# Patient Record
Sex: FEMALE | Race: WHITE
[De-identification: names, ages, dates, MRNs, and addresses within clinical notes are randomized per-mention and may not be internally consistent; named-entity substitution may affect disease eponyms.]

---

## 2017-11-25 NOTE — RADRPT
PROCEDURE:   XR Left second finger

 

CLINICAL INDICATION:   Pain at distal interphalangeal joint foreign body

 

TECHNIQUE:   AP, oblique and lateral views of the left second finger were obtained. 

 

COMPARISON:   No prior studies are available for comparison. 

 

FINDINGS:

In the region of the patient's pain at the level of the proximal interphalangeal joint of the second
 finger is apparent dorsal skin defect. No definite radiopaque foreign body is seen. No fracture or 
dislocation is seen.

 

IMPRESSION:

In the region of the patient's pain at the level of the proximal interphalangeal joint of the second
 finger is apparent dorsal skin defect. No definite radiopaque foreign body is seen. 

 

RPTAT: HJES

_____________________________________________ 

.Ronald Lenz MD, MD           Date    Time 

Electronically viewed and signed by .Ronald Lenz MD, MD on 11/25/2017 02:50 

 

D:  11/25/2017 02:50  T:  11/25/2017 02:50

.S/

## 2017-11-25 NOTE — ERD
ER Documentation


Chief Complaint


Chief Complaint


left index finger pain denies injury, skin avulsion





HPI


22-year-old female complaining of pain to the left index finger.  Patient 

states that one month ago she was at home and a piece of glass broke and cut 

her finger.  She is unsure if there is a foreign body within her finger.  She 

never had site sutured and has developed a abnormal flap of skin over the wound 

site.  Patient has not use any medications on the site





ROS


All systems reviewed and are negative except as per history of present illness.





Medications


Home Meds


Reported Medications


Levothyroxine Sodium* (Levothyroxine Sodium*) 75 Mcg Tablet, 75 MCG PO DAILY


   12


Metformin* (Glucophage*) 500 Mg Tab, 500 MG PO BID


   12





Allergies


Allergies:  


Coded Allergies:  


     No Known Allergy (Unverified , 1/21/15)





PMhx/Soc


Medical and Surgical Hx:  pt denies Surgical Hx


History of Surgery:  No


Anesthesia Reaction:  No


Hx Neurological Disorder:  No


Hx Respiratory Disorders:  Yes (ASTHMA)


Hx Cardiac Disorders:  No


Hx Psychiatric Problems:  No


Hx Miscellaneous Medical Probl:  Yes (hypothyroidism)


Hx Alcohol Use:  No


Hx Substance Use:  No


Hx Tobacco Use:  No


Smoking Status:  Never smoker





Physical Exam


Vitals





Vital Signs








  Date Time  Temp Pulse Resp B/P Pulse Ox O2 Delivery O2 Flow Rate FiO2


 


17 22:58 97.2 87 20 137/74 98   








Physical Exam


GENERAL: The patient is well-appearing, well-nourished, in no acute distress


CHEST: Clear to auscultation bilaterally.  There are no rales, wheezes or 

rhonchi.


HEART: Regular rate and rhythm.  No murmurs, clicks, rubs or gallops.  No S3 or 

S4.


EXTREMITIES: Equal pulses bilaterally.  There is no peripheral clubbing, 

cyanosis or edema.  No focal swelling or erythema.  Full range of motion.  

Grossly neurovascularly intact.


NEUROLOGIC: Alert and oriented.  Cranial nerves II through XII intact.  Motor 

strength in all 4 extremities with 5 out of 5 strength.  Sensation grossly 

intact.  Normal speech and gait.  Babinski negative.  DTR 2+ throughout.


SKIN: skin nodule over left index finger. No open wound. No tenderness to 

palpation





Procedures/MDM


DIAGNOSTIC IMAGING REPORT





 Patient: VALERIE CHEN   : 1995   Age: 22  Sex: F                  

      


 MR #:    [id number]   [facility] #:   [id number]    DOS: [id number]


 Ordering MD: HONORIO DIEGO PA-C   Location:  FTE   Room/Bed:          

                                  


 








PROCEDURE:   XR Left second finger


 


CLINICAL INDICATION:   Pain at distal interphalangeal joint foreign body


 


TECHNIQUE:   AP, oblique and lateral views of the left second finger were 

obtained. 


 


COMPARISON:   No prior studies are available for comparison. 


 


FINDINGS:


In the region of the patient's pain at the level of the proximal 

interphalangeal joint of the second finger is apparent dorsal skin defect. No 

definite radiopaque foreign body is seen. No fracture or dislocation is seen.


 


IMPRESSION:


In the region of the patient's pain at the level of the proximal 

interphalangeal joint of the second finger is apparent dorsal skin defect. No 

definite radiopaque foreign body is seen. 


 


MDM: 22-year-old female complaining of skin nodule at the site of a possible 

foreign body.  X-rays within normal limits and patient does not have tenderness 

to palpation over the wound site.  I believe patient's skin nodule has 

developed because it was a laceration that should have been prepared with 

sutures.  Skin has likely keloid-ed and because a elevation in the skin.  I do 

not feel that there is indication for removal of this keloid and patient is 

recommended to follow-up with PMD within 1 to days for close evaluation.  All 

questions answered discharge.





Departure


Diagnosis:  


 Primary Impression:  


 Soft tissue avulsion


Condition:  Stable


Patient Instructions:  Skin Avulsion


Referrals:  


[facility]


YOU HAVE RECEIVED A MEDICAL SCREENING EXAM AND THE RESULTS INDICATE THAT YOU DO 

NOT HAVE A CONDITION THAT REQUIRES URGENT TREATMENT IN THE EMERGENCY DEPARTMENT.





FURTHER EVALUATION AND TREATMENT OF YOUR CONDITION CAN WAIT UNTIL YOU ARE SEEN 

IN YOUR DOCTORS OFFICE WITHIN THE NEXT 1-2 DAYS. IT IS YOUR RESPONSIBILITY TO 

MAKE AN APPOINTMENT FOR FOLOW-UP CARE.





IF YOU HAVE A PRIMARY DOCTOR


--you should call your primary doctor and schedule an appointment





IF YOU DO NOT HAVE A PRIMARY DOCTOR YOU CAN CALL OUR PHYSICIAN REFERRAL HOTLINE 

AT


 [phone] 





IF YOU CAN NOT AFFORD TO SEE A PHYSICIAN YOU CAN CHOSE FROM THE FOLLOWING 

[facility]





[facility] [phone] [address] [facility]. [facility] [phone] [address] [facility]. [facility] [phone] [address] [facility] [phone] [address] [facility] [phone] [address] [facility]. [facility] [phone] [address]





Additional Instructions:  


FOLLOW UP WITH YOUR PRIMARY CARE PHYSICIAN TOMORROW.Return to this facility if 

you are not improving as expected.











SEVERINO DIEGO PA-C 2017 03:50

## 2018-06-03 ENCOUNTER — HOSPITAL ENCOUNTER (EMERGENCY)
Age: 23
Discharge: HOME | End: 2018-06-03

## 2018-06-03 ENCOUNTER — HOSPITAL ENCOUNTER (EMERGENCY)
Dept: HOSPITAL 91 - FTE | Age: 23
Discharge: HOME | End: 2018-06-03
Payer: COMMERCIAL

## 2018-06-03 DIAGNOSIS — Z79.84: ICD-10-CM

## 2018-06-03 DIAGNOSIS — E03.9: ICD-10-CM

## 2018-06-03 DIAGNOSIS — H11.32: Primary | ICD-10-CM

## 2018-06-03 DIAGNOSIS — J45.909: ICD-10-CM

## 2018-06-03 PROCEDURE — 99282 EMERGENCY DEPT VISIT SF MDM: CPT

## 2021-04-19 ENCOUNTER — HOSPITAL ENCOUNTER (EMERGENCY)
Dept: HOSPITAL 12 - ER | Age: 26
Discharge: HOME | End: 2021-04-19
Payer: COMMERCIAL

## 2021-04-19 VITALS — HEIGHT: 64 IN | WEIGHT: 264 LBS | BODY MASS INDEX: 45.07 KG/M2

## 2021-04-19 DIAGNOSIS — R25.3: Primary | ICD-10-CM

## 2021-04-19 DIAGNOSIS — E66.9: ICD-10-CM

## 2021-04-19 DIAGNOSIS — R51.9: ICD-10-CM

## 2021-04-19 PROCEDURE — A4663 DIALYSIS BLOOD PRESSURE CUFF: HCPCS

## 2022-02-22 ENCOUNTER — HOSPITAL ENCOUNTER (EMERGENCY)
Dept: HOSPITAL 12 - ER | Age: 27
Discharge: HOME | End: 2022-02-22
Payer: COMMERCIAL

## 2022-02-22 VITALS — DIASTOLIC BLOOD PRESSURE: 65 MMHG | SYSTOLIC BLOOD PRESSURE: 139 MMHG

## 2022-02-22 VITALS — WEIGHT: 268 LBS | BODY MASS INDEX: 45.75 KG/M2 | HEIGHT: 64 IN

## 2022-02-22 DIAGNOSIS — K21.9: ICD-10-CM

## 2022-02-22 DIAGNOSIS — Z79.890: ICD-10-CM

## 2022-02-22 DIAGNOSIS — Z79.899: ICD-10-CM

## 2022-02-22 DIAGNOSIS — E66.9: ICD-10-CM

## 2022-02-22 DIAGNOSIS — R10.10: Primary | ICD-10-CM

## 2022-02-22 DIAGNOSIS — E03.9: ICD-10-CM

## 2022-02-22 LAB
ALP SERPL-CCNC: 75 U/L (ref 50–136)
ALT SERPL W/O P-5'-P-CCNC: 25 U/L (ref 14–59)
AST SERPL-CCNC: 14 U/L (ref 15–37)
BILIRUB DIRECT SERPL-MCNC: 0.1 MG/DL (ref 0–0.2)
BILIRUB SERPL-MCNC: 0.5 MG/DL (ref 0.2–1)
BUN SERPL-MCNC: 7 MG/DL (ref 7–18)
CHLORIDE SERPL-SCNC: 102 MMOL/L (ref 98–107)
CO2 SERPL-SCNC: 25 MMOL/L (ref 21–32)
CREAT SERPL-MCNC: 0.7 MG/DL (ref 0.6–1.3)
GLUCOSE SERPL-MCNC: 107 MG/DL (ref 74–106)
HCT VFR BLD AUTO: 41.9 % (ref 31.2–41.9)
LIPASE SERPL-CCNC: 96 U/L (ref 73–393)
MCH RBC QN AUTO: 26.8 UUG (ref 24.7–32.8)
MCV RBC AUTO: 79.8 FL (ref 75.5–95.3)
PLATELET # BLD AUTO: 332 K/UL (ref 179–408)
POTASSIUM SERPL-SCNC: 3.8 MMOL/L (ref 3.5–5.1)
WS STN SPEC: 7.9 G/DL (ref 6.4–8.2)

## 2022-02-22 PROCEDURE — A4663 DIALYSIS BLOOD PRESSURE CUFF: HCPCS

## 2022-02-22 NOTE — NUR
EDMD at bedside to discuss findings and dispo.  EDMD spent approx 10-15min 
discussing course of action with pt.  He determined to give her a GI cocktain, 
a script and referral to GI to r/o gastric alcer.  Pt's vss, pe wnl, otherwise 
healthy individual besides the hypothyroidism.  145/65.  Pt waiting patiently 
for meds.

## 2022-02-22 NOTE — NUR
EDMD at bedside to discuss aftercare with pt.  Pt confirmed understanding of 
instructions and said she will comply.

## 2022-02-22 NOTE — NUR
Pt given dc instructions and med info, told that pharmacist can answer any 
additional questions she may have.  pt confirmed understanding of aftercare and 
seems motivated to make the changes needed to reduce risk factor of HTN and 
GERD.  Discussed some diet and exercise infor with her to help in her lifestyle 
changes.  Pt aaox4, VSS, oxygenating and perfusing well.  Pt signed dc 
instructions and ambulated out of dept with steady gait.